# Patient Record
Sex: MALE | Race: WHITE | NOT HISPANIC OR LATINO | Employment: UNEMPLOYED | URBAN - NONMETROPOLITAN AREA
[De-identification: names, ages, dates, MRNs, and addresses within clinical notes are randomized per-mention and may not be internally consistent; named-entity substitution may affect disease eponyms.]

---

## 2023-03-08 ENCOUNTER — APPOINTMENT (OUTPATIENT)
Dept: CT IMAGING | Facility: HOSPITAL | Age: 28
End: 2023-03-08
Payer: COMMERCIAL

## 2023-03-08 ENCOUNTER — APPOINTMENT (OUTPATIENT)
Dept: GENERAL RADIOLOGY | Facility: HOSPITAL | Age: 28
End: 2023-03-08
Payer: COMMERCIAL

## 2023-03-08 ENCOUNTER — HOSPITAL ENCOUNTER (EMERGENCY)
Facility: HOSPITAL | Age: 28
Discharge: HOME | End: 2023-03-08
Attending: EMERGENCY MEDICINE | Admitting: EMERGENCY MEDICINE
Payer: COMMERCIAL

## 2023-03-08 VITALS
OXYGEN SATURATION: 95 % | WEIGHT: 245 LBS | SYSTOLIC BLOOD PRESSURE: 128 MMHG | DIASTOLIC BLOOD PRESSURE: 94 MMHG | HEART RATE: 98 BPM | HEIGHT: 69 IN | BODY MASS INDEX: 36.29 KG/M2 | RESPIRATION RATE: 18 BRPM | TEMPERATURE: 98.9 F

## 2023-03-08 DIAGNOSIS — R07.9 CHEST PAIN, UNSPECIFIED TYPE: ICD-10-CM

## 2023-03-08 DIAGNOSIS — R03.0 ELEVATED BLOOD PRESSURE READING: Primary | ICD-10-CM

## 2023-03-08 LAB
ALBUMIN SERPL-MCNC: 5 G/DL (ref 3.5–5.2)
ALBUMIN/GLOB SERPL: 1.4 G/DL
ALP SERPL-CCNC: 85 U/L (ref 39–117)
ALT SERPL W P-5'-P-CCNC: 21 U/L (ref 1–41)
ANION GAP SERPL CALCULATED.3IONS-SCNC: 14.2 MMOL/L (ref 5–15)
AST SERPL-CCNC: 17 U/L (ref 1–40)
BASOPHILS # BLD AUTO: 0.03 10*3/MM3 (ref 0–0.2)
BASOPHILS NFR BLD AUTO: 0.4 % (ref 0–1.5)
BILIRUB SERPL-MCNC: 0.6 MG/DL (ref 0–1.2)
BUN SERPL-MCNC: 14 MG/DL (ref 6–20)
BUN/CREAT SERPL: 12.6 (ref 7–25)
CALCIUM SPEC-SCNC: 10.6 MG/DL (ref 8.6–10.5)
CHLORIDE SERPL-SCNC: 95 MMOL/L (ref 98–107)
CO2 SERPL-SCNC: 26.8 MMOL/L (ref 22–29)
CREAT SERPL-MCNC: 1.11 MG/DL (ref 0.76–1.27)
DEPRECATED RDW RBC AUTO: 35.8 FL (ref 37–54)
EGFRCR SERPLBLD CKD-EPI 2021: 92.8 ML/MIN/1.73
EOSINOPHIL # BLD AUTO: 0.1 10*3/MM3 (ref 0–0.4)
EOSINOPHIL NFR BLD AUTO: 1.2 % (ref 0.3–6.2)
ERYTHROCYTE [DISTWIDTH] IN BLOOD BY AUTOMATED COUNT: 11.9 % (ref 12.3–15.4)
GLOBULIN UR ELPH-MCNC: 3.7 GM/DL
GLUCOSE SERPL-MCNC: 96 MG/DL (ref 65–99)
HCT VFR BLD AUTO: 51.7 % (ref 37.5–51)
HGB BLD-MCNC: 17.9 G/DL (ref 13–17.7)
IMM GRANULOCYTES # BLD AUTO: 0.02 10*3/MM3 (ref 0–0.05)
IMM GRANULOCYTES NFR BLD AUTO: 0.2 % (ref 0–0.5)
LYMPHOCYTES # BLD AUTO: 2.41 10*3/MM3 (ref 0.7–3.1)
LYMPHOCYTES NFR BLD AUTO: 28.4 % (ref 19.6–45.3)
MCH RBC QN AUTO: 28.3 PG (ref 26.6–33)
MCHC RBC AUTO-ENTMCNC: 34.6 G/DL (ref 31.5–35.7)
MCV RBC AUTO: 81.8 FL (ref 79–97)
MONOCYTES # BLD AUTO: 0.5 10*3/MM3 (ref 0.1–0.9)
MONOCYTES NFR BLD AUTO: 5.9 % (ref 5–12)
NEUTROPHILS NFR BLD AUTO: 5.44 10*3/MM3 (ref 1.7–7)
NEUTROPHILS NFR BLD AUTO: 63.9 % (ref 42.7–76)
NRBC BLD AUTO-RTO: 0 /100 WBC (ref 0–0.2)
PLATELET # BLD AUTO: 235 10*3/MM3 (ref 140–450)
PMV BLD AUTO: 10.3 FL (ref 6–12)
POTASSIUM SERPL-SCNC: 4.1 MMOL/L (ref 3.5–5.2)
PROT SERPL-MCNC: 8.7 G/DL (ref 6–8.5)
RBC # BLD AUTO: 6.32 10*6/MM3 (ref 4.14–5.8)
SODIUM SERPL-SCNC: 136 MMOL/L (ref 136–145)
TROPONIN T SERPL HS-MCNC: <6 NG/L
WBC NRBC COR # BLD: 8.5 10*3/MM3 (ref 3.4–10.8)

## 2023-03-08 PROCEDURE — 99282 EMERGENCY DEPT VISIT SF MDM: CPT

## 2023-03-08 PROCEDURE — 70450 CT HEAD/BRAIN W/O DYE: CPT

## 2023-03-08 PROCEDURE — 99283 EMERGENCY DEPT VISIT LOW MDM: CPT

## 2023-03-08 PROCEDURE — 93005 ELECTROCARDIOGRAM TRACING: CPT

## 2023-03-08 PROCEDURE — 36415 COLL VENOUS BLD VENIPUNCTURE: CPT

## 2023-03-08 PROCEDURE — 85025 COMPLETE CBC W/AUTO DIFF WBC: CPT

## 2023-03-08 PROCEDURE — 84484 ASSAY OF TROPONIN QUANT: CPT

## 2023-03-08 PROCEDURE — 80053 COMPREHEN METABOLIC PANEL: CPT

## 2023-03-08 PROCEDURE — 71045 X-RAY EXAM CHEST 1 VIEW: CPT

## 2023-03-08 NOTE — DISCHARGE INSTRUCTIONS
Return for any worsening symptoms.  Suggest following up with your primary care for further management of your blood pressure medications and discussions about these medications.  I recommend follow-up with cardiology, their number is included above, you should call to schedule an appoint with them.

## 2023-03-08 NOTE — ED PROVIDER NOTES
"Subjective  History of Present Illness:    This is a 27-year-old male presents emergency room today for chief complaint of hypertension.  Patient reports that he has had hypertension ever since he had a head injury approximately 1-1/2 months ago where he was doing a \"dab\" and lost consciousness, falling hitting his head and apparently having a seizure afterwards.  He followed up with his primary care who reported that he was hypertensive, placed him on blood pressure medications and then also ordered a outpatient scan of his head which the patient was not able to obtain due to insurance issues.  He reports that he was feeling lightheaded over the last month and a half since this happened, his primary care told him this could be the result of blood pressure medications and they switched his blood pressure medications, however warned him that he could have similar symptoms on blood pressure medications of lightheadedness.  He reports today the emergency room today for continued complaints of lightheadedness, and some intermittent episodes of a sharp pain in his left upper chest.  He is currently on hydrochlorothiazide and lisinopril.  He reports that all these feelings started after he hit his head and then after he was placed on blood pressure medications.  He reports that the sharp pain in his left upper chest is not there currently.  He does not report any shortness of air.  He reports that the sharpness in his chest usually occurs when he is overthinking things.       Nurses Notes reviewed and agree, including vitals, allergies, social history and prior medical history.     REVIEW OF SYSTEMS: All systems reviewed and not pertinent unless noted.  Review of Systems   Cardiovascular: Positive for chest pain.   Neurological: Positive for light-headedness.   All other systems reviewed and are negative.      Past Medical History:   Diagnosis Date   • Hypertension    • Seizures (HCC)        Allergies:    Patient has no " "known allergies.      History reviewed. No pertinent surgical history.      Social History     Socioeconomic History   • Marital status: Single   Substance and Sexual Activity   • Drug use: Yes     Types: Marijuana         History reviewed. No pertinent family history.    Objective  Physical Exam:  /94   Pulse 96   Temp 98.9 °F (37.2 °C) (Oral)   Resp 18   Ht 175.3 cm (69\")   Wt 111 kg (245 lb)   SpO2 97%   BMI 36.18 kg/m²      Physical Exam  Vitals and nursing note reviewed.   Constitutional:       General: He is not in acute distress.     Appearance: Normal appearance. He is normal weight. He is not ill-appearing, toxic-appearing or diaphoretic.   HENT:      Head: Normocephalic and atraumatic.      Nose: Nose normal. No congestion or rhinorrhea.      Mouth/Throat:      Mouth: Mucous membranes are moist.   Eyes:      Extraocular Movements: Extraocular movements intact.   Cardiovascular:      Rate and Rhythm: Normal rate and regular rhythm.      Pulses: Normal pulses.      Heart sounds: Normal heart sounds.   Pulmonary:      Effort: Pulmonary effort is normal. No respiratory distress.      Breath sounds: Normal breath sounds. No stridor. No wheezing, rhonchi or rales.   Abdominal:      General: There is no distension.      Palpations: Abdomen is soft.      Tenderness: There is no abdominal tenderness. There is no guarding.   Musculoskeletal:         General: Normal range of motion.      Cervical back: Normal range of motion and neck supple. No rigidity or tenderness.   Skin:     General: Skin is warm and dry.      Capillary Refill: Capillary refill takes less than 2 seconds.   Neurological:      General: No focal deficit present.      Mental Status: He is alert and oriented to person, place, and time.      Cranial Nerves: No cranial nerve deficit.      Sensory: No sensory deficit.      Motor: No weakness.      Coordination: Coordination normal.      Gait: Gait normal.      Deep Tendon Reflexes: Reflexes " normal.   Psychiatric:         Mood and Affect: Mood normal.         Behavior: Behavior normal.         Thought Content: Thought content normal.         Judgment: Judgment normal.               Procedures    ED Course:    ED Course as of 03/08/23 1558   Wed Mar 08, 2023   1359 Will obtain CT of the head, ECG, high-sensitivity troponin, CBC and CMP. [JR]   1415 EKG interpreted by me reveals sinus rhythm with rate of 95 bpm.  There is sinus arrhythmia.  Low QRS voltage in chest leads.  No acute ischemic findings.  This is an atypical appearing EKG [TB]      ED Course User Index  [JR] Owen Taylor PA-C  [TB] Tigist Rodarte MD       Lab Results (last 24 hours)     Procedure Component Value Units Date/Time    CBC Auto Differential [[id number]]  (Abnormal) Collected: 03/08/23 1353    Specimen: Blood Updated: 03/08/23 1359     WBC 8.50 10*3/mm3      RBC 6.32 10*6/mm3      Hemoglobin 17.9 g/dL      Hematocrit 51.7 %      MCV 81.8 fL      MCH 28.3 pg      MCHC 34.6 g/dL      RDW 11.9 %      RDW-SD 35.8 fl      MPV 10.3 fL      Platelets 235 10*3/mm3      Neutrophil % 63.9 %      Lymphocyte % 28.4 %      Monocyte % 5.9 %      Eosinophil % 1.2 %      Basophil % 0.4 %      Immature Grans % 0.2 %      Neutrophils, Absolute 5.44 10*3/mm3      Lymphocytes, Absolute 2.41 10*3/mm3      Monocytes, Absolute 0.50 10*3/mm3      Eosinophils, Absolute 0.10 10*3/mm3      Basophils, Absolute 0.03 10*3/mm3      Immature Grans, Absolute 0.02 10*3/mm3      nRBC 0.0 /100 WBC     Comprehensive Metabolic Panel [[id number]]  (Abnormal) Collected: 03/08/23 1353    Specimen: Blood Updated: 03/08/23 1418     Glucose 96 mg/dL      BUN 14 mg/dL      Creatinine 1.11 mg/dL      Sodium 136 mmol/L      Potassium 4.1 mmol/L      Chloride 95 mmol/L      CO2 26.8 mmol/L      Calcium 10.6 mg/dL      Total Protein 8.7 g/dL      Albumin 5.0 g/dL      ALT (SGPT) 21 U/L      AST (SGOT) 17 U/L      Alkaline Phosphatase 85 U/L      Total Bilirubin 0.6  mg/dL      Globulin 3.7 gm/dL      A/G Ratio 1.4 g/dL      BUN/Creatinine Ratio 12.6     Anion Gap 14.2 mmol/L      eGFR 92.8 mL/min/1.73     Narrative:      GFR Normal >60  Chronic Kidney Disease <60  Kidney Failure <15      High Sensitivity Troponin T [[id number]]  (Normal) Collected: 03/08/23 1353    Specimen: Blood Updated: 03/08/23 1420     HS Troponin T <6 ng/L     Narrative:      High Sensitive Troponin T Reference Range:  <10.0 ng/L- Negative Female for AMI  <15.0 ng/L- Negative Male for AMI  >=10 - Abnormal Female indicating possible myocardial injury.  >=15 - Abnormal Male indicating possible myocardial injury.   Clinicians would have to utilize clinical acumen, EKG, Troponin, and serial changes to determine if it is an Acute Myocardial Infarction or myocardial injury due to an underlying chronic condition.                CT Head Without Contrast    Result Date: 3/8/2023  PROCEDURE: CT HEAD WO CONTRAST-  INDICATION: lightheadedness, hit head one month ago with + LOC  TECHNIQUE: Multiple axial CT images were performed from the foramen magnum to the vertex without contrast.   Coronal reconstruction images were obtained from the axial data.  COMPARISON: None.  FINDINGS: There is no mass effect or midline shift.  The ventricles are symmetric in size and configuration.  There is no hydrocephalus.  There are no extraaxial fluid collections.  There is no intraventricular or intraparenchymal hemorrhage.  The posterior fossa has a normal CT appearance.  The soft tissues are within normal limits. No acute osseous abnormalities are present.      Impression: No acute intracranial abnormality.       836.26 mGy.cm    This study was performed with techniques to keep radiation doses as low as reasonably achievable (ALARA). Individualized dose reduction techniques using automated exposure control or adjustment of mA and/or kV according to the patient size were employed.  This report was signed and finalized on 3/8/2023 2:38  "PM by Ashley Miller MD.    XR Chest 1 View    Result Date: 3/8/2023  PROCEDURE: XR CHEST 1 VW-    HISTORY: Hypertension, anterior chest pain  COMPARISON: None.  FINDINGS: The heart is normal in size. The mediastinum is unremarkable. The lungs are clear. There is no pneumothorax. There are no acute osseous abnormalities.      Impression: No acute cardiopulmonary process.        Images were reviewed, interpreted, and dictated by Dr. Ashley Miller MD Transcribed by Mel Nieves PA-C.  This report was signed and finalized on 3/8/2023 3:53 PM by Ashley Miller MD.         MDM    Initial impression of presenting illness: This is a 27-year-old male presents emergency room today for chief complaint of hypertension.  Patient reports that he has had hypertension ever since he had a head injury approximately 1-1/2 months ago where he was doing a \"dab\" and lost consciousness, falling hitting his head and apparently having a seizure afterwards.  He followed up with his primary care who reported that he was hypertensive, placed him on blood pressure medications and then also ordered a outpatient scan of his head which the patient was not able to obtain due to insurance issues.  He reports that he was feeling lightheaded over the last month and a half since this happened, his primary care told him this could be the result of blood pressure medications and they switched his blood pressure medications, however warned him that he could have similar symptoms on blood pressure medications of lightheadedness.  He reports today the emergency room today for continued complaints of lightheadedness, and some intermittent episodes of a sharp pain in his left upper chest.  He is currently on hydrochlorothiazide and lisinopril.  He reports that all these feelings started after he hit his head and then after he was placed on blood pressure medications.  He reports that the sharp pain in his left upper chest is not there currently.  He " does not report any shortness of air.  He reports that the sharpness in his chest usually occurs when he is overthinking things.  Patient reports that he does not currently feel the sharp pain in his left upper chest, it is nonradiating when it occurs, and has been occurring for over a month at this point at least once daily and last only a few seconds at a time.    DDX: includes but is not limited to: Intracranial abnormality, electrolyte disturbance, costochondritis, medication adverse effect of blood pressure medications.ACS, Although extremely low suspicion given the patient's age and lack of risk factors and chronicity of the issue    Patient arrives hemodynamically stable, afebrile, some hypertension at 133/94, nontachycardic, nonhypoxic, nontoxic-appearing with vitals interpreted by myself.     Pertinent features from physical exam: Patient has grossly intact neurological exam, no abnormal coordination, equal upper and lower extremity strength, equal sensation.  Cardiac auscultation regular rate rhythm, lungs clear to auscultation bilaterally.  No tenderness to palpation of the chest.    Initial diagnostic plan: CT of the head without contrast, CBC, CMP, troponin, EKG, chest x-ray    Results from initial plan were reviewed and interpreted by me revealing opponent within normal limits.  CMP essentially negative.  CBC also essentially negative.  EKG revealed sinus rhythm rate of 95 with sinus arrhythmia no acute ischemic changes.  CT of the head with no acute intracranial abnormality.  X-ray of the chest plain film with no acute cardiopulmonary process per radiology.  I personally evaluate this plain film and concur with radiology interpretation.  Cardiac enzymes normal.      Diagnostic information from other sources: N/A    Interventions / Re-evaluation: No interventions necessary at this time, no active sharp pain in the chest.  He is stable for discharge home.  Neurologic exam benign.    Results/clinical  rationale were discussed with patient bedside.  Discussed at this point with CT of the head that is negative, no electrolyte abnormalities, EKG, or other blood work to explain the patient's symptoms, believe that this is likely and probable more related to the patient's medication side effects/adverse effects given that he is on 2 different blood pressure medications.  Discussed strict return precautions.  He is agreeable to follow-up with primary care for further management of his medications and discussions with his providers about his blood pressure medications and the effects he has been experiencing since beginning these medications.     Heart score of 1 given the patient's recent history of hypertension.  Otherwise negative.     Consultations/Discussion of results with other physicians: N/A    Disposition plan: Discharge home, primary care follow-up.  Patient agreeable to also follow-up with cardiology although I am not highly suspicious of a cardiac etiology of the 1 episode of sharp pain that the patient has been feeling over the last month for a few seconds each day.  Return precautions given.  -----    Final diagnoses:   Elevated blood pressure reading   Chest pain, unspecified type        Owen Taylor PA-C  03/08/23 4790

## 2023-03-10 ENCOUNTER — HOSPITAL ENCOUNTER (EMERGENCY)
Facility: HOSPITAL | Age: 28
Discharge: HOME | End: 2023-03-10
Attending: EMERGENCY MEDICINE | Admitting: EMERGENCY MEDICINE
Payer: COMMERCIAL

## 2023-03-10 VITALS
BODY MASS INDEX: 35.55 KG/M2 | OXYGEN SATURATION: 99 % | RESPIRATION RATE: 16 BRPM | HEART RATE: 77 BPM | HEIGHT: 69 IN | SYSTOLIC BLOOD PRESSURE: 134 MMHG | DIASTOLIC BLOOD PRESSURE: 92 MMHG | WEIGHT: 240 LBS | TEMPERATURE: 98.3 F

## 2023-03-10 DIAGNOSIS — F41.9 ANXIETY: ICD-10-CM

## 2023-03-10 DIAGNOSIS — I10 HYPERTENSION, UNSPECIFIED TYPE: Primary | ICD-10-CM

## 2023-03-10 LAB
BILIRUB UR QL STRIP: NEGATIVE
CLARITY UR: CLEAR
COLOR UR: YELLOW
GLUCOSE UR STRIP-MCNC: NEGATIVE MG/DL
HGB UR QL STRIP.AUTO: NEGATIVE
KETONES UR QL STRIP: NEGATIVE
LEUKOCYTE ESTERASE UR QL STRIP.AUTO: NEGATIVE
NITRITE UR QL STRIP: NEGATIVE
PH UR STRIP.AUTO: 7 [PH] (ref 5–8)
PROT UR QL STRIP: NEGATIVE
SP GR UR STRIP: 1.02 (ref 1–1.03)
UROBILINOGEN UR QL STRIP: NORMAL

## 2023-03-10 PROCEDURE — 81003 URINALYSIS AUTO W/O SCOPE: CPT

## 2023-03-10 PROCEDURE — 93005 ELECTROCARDIOGRAM TRACING: CPT

## 2023-03-10 PROCEDURE — 99284 EMERGENCY DEPT VISIT MOD MDM: CPT

## 2023-03-10 RX ORDER — PROPRANOLOL HYDROCHLORIDE 10 MG/1
10 TABLET ORAL 2 TIMES DAILY
Qty: 60 TABLET | Refills: 0 | Status: SHIPPED | OUTPATIENT
Start: 2023-03-10 | End: 2023-03-14 | Stop reason: SDUPTHER

## 2023-03-10 RX ORDER — PROPRANOLOL HYDROCHLORIDE 10 MG/1
10 TABLET ORAL ONCE
Status: COMPLETED | OUTPATIENT
Start: 2023-03-10 | End: 2023-03-10

## 2023-03-10 RX ORDER — HYDROCHLOROTHIAZIDE 12.5 MG/1
12.5 CAPSULE, GELATIN COATED ORAL DAILY
COMMUNITY
Start: 2023-03-03 | End: 2023-03-14 | Stop reason: ALTCHOICE

## 2023-03-10 RX ORDER — LISINOPRIL 10 MG/1
10 TABLET ORAL DAILY
COMMUNITY
Start: 2023-03-03 | End: 2023-03-14 | Stop reason: ALTCHOICE

## 2023-03-10 RX ADMIN — PROPRANOLOL HYDROCHLORIDE 10 MG: 10 TABLET ORAL at 17:42

## 2023-03-10 NOTE — ED PROVIDER NOTES
"Subjective  History of Present Illness:    Patient is a 27-year-old male here today with high blood pressure and elevated heart rate.  He is accompanied by his wife who helps provide history as well.  Patient was seen here 2 days ago for similar complaint and had a negative work-up, including labs, EKG, and CT of the head.  Today he notes that he has been taking half doses of his lisinopril because he is feeling dizzy/unsteady with his current blood pressure regimen.  He has not had his HCTZ for 2 days.  He does admit to checking his blood pressure very often at home and will become concerned/anxious over the readings.  Before taking the HCTZ and lisinopril, the patient was on amlodipine for approximately 1 month and experienced dizziness during the entire time which is why he had medication changes.  He has noted consistent dizziness with taking the HCTZ and lisinopril.  Not having the HCTZ over the past 2 days, he has noted intermittent dizziness.  Also notes that he is feeling very thirsty and having a dry mouth, thinks that this may be due to the HCTZ.    Nurses Notes reviewed and agree, including vitals, allergies, social history and prior medical history.     REVIEW OF SYSTEMS: All systems reviewed and not pertinent unless noted.  Review of Systems    Past Medical History:   Diagnosis Date   • Hypertension    • Seizures (HCC)        Allergies:    Patient has no known allergies.      History reviewed. No pertinent surgical history.      Social History     Socioeconomic History   • Marital status: Single   Tobacco Use   • Smoking status: Never   Substance and Sexual Activity   • Alcohol use: Not Currently   • Drug use: Yes     Types: Marijuana         History reviewed. No pertinent family history.    Objective  Physical Exam:  /92   Pulse 77   Temp 98.3 °F (36.8 °C) (Oral)   Resp 16   Ht 175.3 cm (69\")   Wt 109 kg (240 lb)   SpO2 99%   BMI 35.44 kg/m²      Physical Exam  Vitals and nursing note " reviewed.   Constitutional:       Appearance: Normal appearance. He is normal weight.   HENT:      Head: Normocephalic and atraumatic.   Cardiovascular:      Rate and Rhythm: Regular rhythm. Tachycardia present.      Pulses: Normal pulses.      Heart sounds: Normal heart sounds.   Pulmonary:      Effort: Pulmonary effort is normal.      Breath sounds: Normal breath sounds.   Abdominal:      General: Abdomen is flat. Bowel sounds are normal. There is no distension.      Palpations: Abdomen is soft.      Tenderness: There is no abdominal tenderness.   Musculoskeletal:      Right lower leg: No edema.      Left lower leg: No edema.   Skin:     General: Skin is warm and dry.   Neurological:      General: No focal deficit present.      Mental Status: He is alert and oriented to person, place, and time.   Psychiatric:         Mood and Affect: Mood normal.         Behavior: Behavior normal.         Procedures    ED Course:         Lab Results (last 24 hours)     Procedure Component Value Units Date/Time    Urinalysis With Microscopic If Indicated (No Culture) - Urine, Clean Catch [[id number]]  (Normal) Collected: 03/10/23 1714    Specimen: Urine, Clean Catch Updated: 03/10/23 1721     Color, UA Yellow     Appearance, UA Clear     pH, UA 7.0     Specific Gravity, UA 1.023     Glucose, UA Negative     Ketones, UA Negative     Bilirubin, UA Negative     Blood, UA Negative     Protein, UA Negative     Leuk Esterase, UA Negative     Nitrite, UA Negative     Urobilinogen, UA 0.2 E.U./dL    Narrative:      Urine microscopic not indicated.           No radiology results from the last 24 hrs       MDM    Initial impression of presenting illness: Hypertension, tachycardia    DDX: includes but is not limited to: Uncontrolled hypertension, orthostatic tachycardia, dehydration, underlying anxiety    Patient arrives radiograph with vitals interpreted by myself.     Pertinent features from physical exam: Tachycardia ranging from the 104 to  130 bpm.    Initial diagnostic plan: Orthostatic vital signs, EKG, urinalysis, and propranolol    Results from initial plan were reviewed and interpreted by me revealing normal urinalysis with no evidence of blood or protein.  His heart rate increased with sitting and standing, but while the blood pressure was taking it did normalize back to the rate he was experiencing while lying.  Blood pressure did not change during orthostatic vital signs.  He did not have dizziness or weakness with changing positions.    Diagnostic information from other sources: Medical record and reviewed his visit from 2 days ago where he had a complete normal work-up including labs and CT scans.    Interventions / Re-evaluation: Treated patient's blood pressure as well as possible underlying anxiety with propranolol.  After taking 10 mg he had a significant improvement in his heart rate into the 80s, and his blood pressure slowly improved to a systolic in the 130s.    Results/clinical rationale were discussed with Dr. Daley    Consultations/Discussion of results with other physicians: None    Disposition plan: Patient is a 27-year-old male here today with hypertension and tachycardia most likely due to underlying anxiety that is not treated.  I chose propranolol as an option to treat these symptoms which was successful during his visit.  Advised him that since he has not had the HCTZ for 2 days, he should not restart it.  And if he is still experiencing symptoms with the half tablet of lisinopril, he can discontinue this as well and instead take propranolol twice daily.  Encouraged him to only check his blood pressure twice a day before he takes his propranolol and cannot check his blood pressure otherwise this may make him feel more anxious ultimately causing his blood pressure to increase.  He has a follow-up appointment with his primary provider next Friday and advised him to keep a log of his blood pressure readings and heart rate.   Patient is safe for discharge  -----    Final diagnoses:   Hypertension, unspecified type   Anxiety        Cristian Harmon, APRN  03/11/23 0020

## 2023-03-11 NOTE — DISCHARGE INSTRUCTIONS
Your heart rate and blood pressure seem to improve after taking the propranolol.  I suspect that you may be having underlying anxiety that is potentially causing you to have an increase in your blood pressure and heart rate despite your lisinopril and HCTZ.  Since you have not had your HCTZ for the past 2 days, do not restart this medication.  And since you are having dizziness associated with the lisinopril, even at half doses, would not take your dose tomorrow and instead only take the propranolol as directed.  Would recommend you check your blood pressure before each dose of propranolol and do not take your blood pressure in between or frequently as this can cause your blood pressure to worsen.  Follow-up with your primary provider as directed on Friday, keep track of your blood pressures and heart rate, and inform them of the changes to your medication.

## 2023-03-14 ENCOUNTER — OFFICE VISIT (OUTPATIENT)
Dept: CARDIOLOGY | Facility: CLINIC | Age: 28
End: 2023-03-14
Payer: COMMERCIAL

## 2023-03-14 VITALS
SYSTOLIC BLOOD PRESSURE: 138 MMHG | OXYGEN SATURATION: 99 % | HEART RATE: 74 BPM | DIASTOLIC BLOOD PRESSURE: 90 MMHG | WEIGHT: 247 LBS | RESPIRATION RATE: 18 BRPM | HEIGHT: 69 IN | BODY MASS INDEX: 36.58 KG/M2

## 2023-03-14 DIAGNOSIS — R00.2 PALPITATIONS: ICD-10-CM

## 2023-03-14 DIAGNOSIS — R07.9 CHEST PAIN, UNSPECIFIED TYPE: ICD-10-CM

## 2023-03-14 DIAGNOSIS — E66.01 SEVERE OBESITY (BMI 35.0-39.9) WITH COMORBIDITY: ICD-10-CM

## 2023-03-14 DIAGNOSIS — I10 ESSENTIAL HYPERTENSION: Primary | ICD-10-CM

## 2023-03-14 PROCEDURE — 99204 OFFICE O/P NEW MOD 45 MIN: CPT | Performed by: INTERNAL MEDICINE

## 2023-03-14 RX ORDER — AMLODIPINE BESYLATE 5 MG/1
TABLET ORAL
COMMUNITY
Start: 2022-12-30 | End: 2023-03-14 | Stop reason: ALTCHOICE

## 2023-03-14 RX ORDER — PROPRANOLOL HYDROCHLORIDE 10 MG/1
10 TABLET ORAL 2 TIMES DAILY
Qty: 180 TABLET | Refills: 3 | Status: SHIPPED | OUTPATIENT
Start: 2023-03-14

## 2023-03-14 RX ORDER — AMLODIPINE BESYLATE 10 MG/1
TABLET ORAL
COMMUNITY
Start: 2023-03-01 | End: 2023-03-14 | Stop reason: ALTCHOICE

## 2023-03-14 NOTE — PROGRESS NOTES
"     [facility] Cardiology OP Consult Note    Manjinder Amezcua  [id number]  2023    Referred By: No ref. provider found    Chief Complaint: Hypertension and palpitations    History of Present Illness:   Mr. Manjinder Amezcua is a 27 y.o. male who presents to the Cardiology Clinic for evaluation of palpitations and hypertension.  The patient has a past medical history significant for anxiety, hypertension, and obesity with a BMI 36.  He does not have any significant past cardiac history.  His more recent medical history significant for evaluation in the emergency department on 3/8/2023 and again on 3/11/2023 for evaluation of hypertension.  The patient reports he has recently been on several antihypertensive medications including amlodipine, HCTZ, and lisinopril.  While on lisinopril, the patient reports a \"dry mouth\" as well as episodes of dizziness and lightheadedness.  He also reported episodes of palpitations described as a \"racing heart rate.\"  He denies any presyncopal or syncopal events.  He does report a \"sharp\" pain located focally over his left anterior chest.  The chest discomfort typically lasts several seconds before resolving spontaneously.  No association with exertion.  Upon his recent ED evaluation, an ECG showed sinus tachycardia at 110 bpm with no significant abnormalities other than rate.  At that time, blood work including a high-sensitivity troponin was within normal limits.  His HCTZ was discontinued, and the patient was started on propanolol.  With propanolol, the patient reports resolution of his palpitations.  He also reports his systolic blood pressure has been better controlled with his highest recent reading being 150 mm systolic.  No other specific complaints today.    Past Cardiac Testin. Last Coronary Angio: None  2. Prior Stress Testing: None  3. Last Echo: None  4. Prior Holter Monitor: None    Review of Systems:   Review of Systems   Constitutional: Negative for " "activity change, appetite change, chills, diaphoresis, fatigue, fever, unexpected weight gain and unexpected weight loss.   Eyes: Negative for blurred vision and double vision.   Respiratory: Negative for cough, chest tightness, shortness of breath and wheezing.    Cardiovascular: Positive for chest pain and palpitations. Negative for leg swelling.   Gastrointestinal: Negative for abdominal pain, anal bleeding, blood in stool and GERD.   Endocrine: Negative for cold intolerance and heat intolerance.   Genitourinary: Negative for hematuria.   Neurological: Positive for dizziness and light-headedness. Negative for syncope and weakness.   Hematological: Does not bruise/bleed easily.   Psychiatric/Behavioral: Negative for depressed mood and stress. The patient is not nervous/anxious.        Past Medical History:   Past Medical History:   Diagnosis Date   • Hypertension    • Seizures (HCC)        Past Surgical History: No past surgical history on file.    Family History: No family history on file.    Social History:   Social History     Socioeconomic History   • Marital status: Single   Tobacco Use   • Smoking status: Never     Passive exposure: Never   Substance and Sexual Activity   • Alcohol use: Not Currently   • Drug use: Yes     Types: Marijuana       Medications:     Current Outpatient Medications:   •  propranolol (INDERAL) 10 MG tablet, Take 1 tablet by mouth 2 (Two) Times a Day., Disp: 180 tablet, Rfl: 3    Allergies:   No Known Allergies    Physical Exam:  Vital Signs:   Vitals:    03/14/23 1125   BP: 138/90   BP Location: Right arm   Patient Position: Sitting   Pulse: 74   Resp: 18   SpO2: 99%   Weight: 112 kg (247 lb)   Height: 175.3 cm (69\")       Physical Exam  Constitutional:       General: He is not in acute distress.     Appearance: He is obese. He is not diaphoretic.   HENT:      Head: Normocephalic and atraumatic.   Cardiovascular:      Rate and Rhythm: Normal rate and regular rhythm.      Heart " sounds: No murmur heard.  Pulmonary:      Effort: Pulmonary effort is normal. No respiratory distress.      Breath sounds: Normal breath sounds. No stridor. No wheezing, rhonchi or rales.   Abdominal:      General: Bowel sounds are normal. There is no distension.      Palpations: Abdomen is soft.      Tenderness: There is no abdominal tenderness. There is no guarding or rebound.   Musculoskeletal:         General: No swelling. Normal range of motion.      Cervical back: Neck supple. No tenderness.   Skin:     General: Skin is warm and dry.   Neurological:      General: No focal deficit present.      Mental Status: He is alert and oriented to person, place, and time.   Psychiatric:         Mood and Affect: Mood normal.         Behavior: Behavior normal.         Results Review:   I reviewed the patient's new clinical results.        Assessment / Plan:     1. Essential hypertension  -- Blood pressure appears to be better controlled today with systolic BP in the 130s  -- Will continue current dose of propanolol  -- Advised to continue to monitor blood pressure at home, systolic BP sustaining greater than 140 mmHg Will uptitrate propanolol  -- Follow-up in 3 months to reassess BP    2. Chest pain  -- Episodes of chest pain are atypical in character, more suggestive of musculoskeletal etiology  --Recent ECG without evidence of acute or prior ischemia  --High-sensitivity troponin within normal limits, no evidence of ACS  --Given chest pain is atypical to noncardiac in character low suspicion for ischemia, will defer further ischemic work-up for now    3. Palpitations  -- Recent ECG showed mild sinus tachycardia  --Prior TSH within normal limits  --Interval resolution of palpitations with propanolol  --If recurrent palpitations, would consider outpatient cardiac monitoring    4. Severe obesity (BMI 35.0-39.9)   -- Weight loss advised        Follow Up:   Return in about 3 months (around 6/14/2023).      Thank you for  allowing me to participate in the care of your patient. Please to not hesitate to contact me with additional questions or concerns.     VIRGINIE Dempsey MD  Interventional Cardiology   03/14/2023  11:29 EDT

## 2023-07-22 ENCOUNTER — HOSPITAL ENCOUNTER (EMERGENCY)
Facility: HOSPITAL | Age: 28
Discharge: HOME | End: 2023-07-22
Attending: EMERGENCY MEDICINE | Admitting: EMERGENCY MEDICINE
Payer: COMMERCIAL

## 2023-07-22 ENCOUNTER — APPOINTMENT (OUTPATIENT)
Dept: GENERAL RADIOLOGY | Facility: HOSPITAL | Age: 28
End: 2023-07-22
Payer: COMMERCIAL

## 2023-07-22 VITALS
BODY MASS INDEX: 34.8 KG/M2 | SYSTOLIC BLOOD PRESSURE: 139 MMHG | RESPIRATION RATE: 18 BRPM | DIASTOLIC BLOOD PRESSURE: 91 MMHG | OXYGEN SATURATION: 97 % | HEIGHT: 69 IN | HEART RATE: 91 BPM | TEMPERATURE: 98.7 F | WEIGHT: 235 LBS

## 2023-07-22 DIAGNOSIS — M25.531 RIGHT WRIST PAIN: Primary | ICD-10-CM
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
## 2023-07-22 PROCEDURE — 99282 EMERGENCY DEPT VISIT SF MDM: CPT

## 2023-07-22 PROCEDURE — 73110 X-RAY EXAM OF WRIST: CPT
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
## 2023-08-03 ENCOUNTER — OFFICE VISIT (OUTPATIENT)
Dept: CARDIOLOGY | Facility: CLINIC | Age: 28
End: 2023-08-03
Payer: COMMERCIAL

## 2023-08-03 VITALS
HEIGHT: 69 IN | RESPIRATION RATE: 16 BRPM | OXYGEN SATURATION: 97 % | WEIGHT: 251 LBS | BODY MASS INDEX: 37.18 KG/M2 | HEART RATE: 85 BPM | DIASTOLIC BLOOD PRESSURE: 92 MMHG | SYSTOLIC BLOOD PRESSURE: 132 MMHG

## 2023-08-03 DIAGNOSIS — I10 PRIMARY HYPERTENSION: Primary | ICD-10-CM

## 2023-08-03 DIAGNOSIS — R00.2 PALPITATIONS: ICD-10-CM

## 2023-08-03 DIAGNOSIS — E66.01 SEVERE OBESITY (BMI 35.0-39.9) WITH COMORBIDITY: ICD-10-CM

## 2023-09-06 ENCOUNTER — TRANSCRIBE ORDERS (OUTPATIENT)
Dept: ADMINISTRATIVE | Facility: HOSPITAL | Age: 28
End: 2023-09-06
Payer: COMMERCIAL

## 2023-09-14 ENCOUNTER — TELEPHONE (OUTPATIENT)
Dept: SURGERY | Facility: CLINIC | Age: 28
End: 2023-09-14

## 2023-09-14 NOTE — TELEPHONE ENCOUNTER
Patient no showed for appointment with Dr Vicente. Called and spoke with patient. He stated he forgot appointment. Rescheduled to next Thursday.

## 2023-09-18 NOTE — PROGRESS NOTES
Patient: Manjinder Amezcua    YOB: 1995    Date: 09/21/2023    Primary Care Provider: Vijay Williamson DO    Chief Complaint   Patient presents with    Rectal Bleeding       SUBJECTIVE:    History of present illness: Patient with complaint of rectal bleeding.  He states this is intermittent.  Can happen 1 or 2 times a year.  Last for couple of days.  He does not have constipation but occasionally has diarrhea but none recently.  No abdominal pain.  The following portions of the patient's history were reviewed and updated as appropriate: allergies, current medications, past family history, past medical history, past social history, past surgical history and problem list.      Review of Systems   Constitutional:  Negative for chills, diaphoresis, fatigue, fever and unexpected weight change.   HENT:  Negative for dental problem, drooling, ear discharge, hearing loss, trouble swallowing and voice change.    Eyes:  Negative for visual disturbance.   Respiratory:  Negative for apnea, cough, choking, chest tightness, shortness of breath and wheezing.    Cardiovascular:  Negative for chest pain, palpitations and leg swelling.   Gastrointestinal:  Positive for anal bleeding. Negative for abdominal distention, abdominal pain, blood in stool, constipation, diarrhea, nausea, rectal pain and vomiting.   Endocrine: Negative for cold intolerance and heat intolerance.   Genitourinary:  Negative for difficulty urinating, dysuria, flank pain, frequency, hematuria, scrotal swelling and testicular pain.   Musculoskeletal:  Negative for back pain, gait problem and joint swelling.   Skin:  Negative for color change, rash and wound.   Neurological:  Negative for dizziness, seizures, syncope, speech difficulty, weakness, light-headedness, numbness and headaches.   Hematological:  Negative for adenopathy. Does not bruise/bleed easily.   Psychiatric/Behavioral:  Negative for agitation, behavioral problems and confusion. The  "patient is not nervous/anxious.      Allergies:  No Known Allergies    Medications:    Current Outpatient Medications:     propranolol (INDERAL) 10 MG tablet, Take 1 tablet by mouth 2 (Two) Times a Day., Disp: 180 tablet, Rfl: 3    History:  Past Medical History:   Diagnosis Date    Hypertension     Seizures        No past surgical history on file.    History reviewed. No pertinent family history.    Social History     Tobacco Use    Smoking status: Never     Passive exposure: Never    Smokeless tobacco: Never   Vaping Use    Vaping Use: Never used   Substance Use Topics    Alcohol use: Not Currently    Drug use: Yes     Types: Marijuana        OBJECTIVE:    Vital Signs:   Vitals:    09/21/23 1417   BP: 138/92   Pulse: 67   Temp: 98.4 °F (36.9 °C)   TempSrc: Temporal   SpO2: 98%   Weight: 115 kg (253 lb 3.2 oz)   Height: 175.3 cm (69\")       Physical Exam:   General Appearance:    Alert, cooperative, in no acute distress   Head:    Normocephalic, without obvious abnormality, atraumatic   Eyes:            Normal.  No scleral icterus.  PERRLA    Lungs:     Clear to auscultation,respirations regular, even and                  unlabored    Heart:    Regular rhythm and normal rate, normal S1 and S2, no            murmur   Abdomen:   Soft and nontender   Extremities:   Moves all extremities well, no edema, no cyanosis, no             redness   Skin:   No bleeding, bruising or rash   Neurologic:   Normal without gross deficits.   Psychiatric: No evidence of depression or anxiety        Results Review:   None    Review of Systems was reviewed and confirmed as accurate as documented by the MA.    ASSESSMENT/PLAN:    1. Rectal bleeding        I recommend a colonoscopy with possible hemorrhoid banding.  He understands the procedures as well as the risks of bleeding and infection as well as perforation he understands ramifications of these potential complications and at this time he does wish to proceed with a " colonoscopy  .        Electronically signed by Jann Cancino MD  09/21/23

## 2023-09-21 ENCOUNTER — OFFICE VISIT (OUTPATIENT)
Dept: SURGERY | Facility: CLINIC | Age: 28
End: 2023-09-21
Payer: COMMERCIAL

## 2023-09-21 ENCOUNTER — PATIENT ROUNDING (BHMG ONLY) (OUTPATIENT)
Dept: SURGERY | Facility: CLINIC | Age: 28
End: 2023-09-21
Payer: COMMERCIAL

## 2023-09-21 VITALS
OXYGEN SATURATION: 98 % | SYSTOLIC BLOOD PRESSURE: 138 MMHG | WEIGHT: 253.2 LBS | TEMPERATURE: 98.4 F | HEIGHT: 69 IN | BODY MASS INDEX: 37.5 KG/M2 | DIASTOLIC BLOOD PRESSURE: 92 MMHG | HEART RATE: 67 BPM

## 2023-09-21 DIAGNOSIS — K62.5 RECTAL BLEEDING: Primary | ICD-10-CM

## 2023-09-21 PROCEDURE — 99204 OFFICE O/P NEW MOD 45 MIN: CPT | Performed by: SURGERY

## 2023-09-21 PROCEDURE — 1160F RVW MEDS BY RX/DR IN RCRD: CPT | Performed by: SURGERY

## 2023-09-21 PROCEDURE — 1159F MED LIST DOCD IN RCRD: CPT | Performed by: SURGERY

## 2023-09-21 PROCEDURE — 3075F SYST BP GE 130 - 139MM HG: CPT | Performed by: SURGERY

## 2023-09-21 PROCEDURE — 3080F DIAST BP >= 90 MM HG: CPT | Performed by: SURGERY

## 2023-09-21 RX ORDER — POLYETHYLENE GLYCOL 3350 17 G/17G
238 POWDER, FOR SOLUTION ORAL ONCE
Qty: 17 PACKET | Refills: 0 | Status: SHIPPED | OUTPATIENT
Start: 2023-09-21 | End: 2023-09-21

## 2023-09-21 RX ORDER — BISACODYL 5 MG/1
5 TABLET, DELAYED RELEASE ORAL TAKE AS DIRECTED
Qty: 4 TABLET | Refills: 0 | Status: SHIPPED | OUTPATIENT
Start: 2023-09-21 | End: 2024-09-20

## 2023-09-21 NOTE — PROGRESS NOTES
September 21, 2023    Hello, may I speak with Manjinder Amezcua?    My name is Chitra     I am  with [facility]  [address]  [place] [id number] [phone].    Before we get started may I verify your date of birth? 1995    I am calling to officially welcome you to our practice and ask about your recent visit. Is this a good time to talk? yes    Tell me about your visit with us. What things went well?  Amazing visit       We're always looking for ways to make our patients' experiences even better. Do you have recommendations on ways we may improve?  no    Overall were you satisfied with your first visit to our practice? yes       I appreciate you taking the time to speak with me today. Is there anything else I can do for you? no      Thank you, and have a great day.

## 2023-09-29 ENCOUNTER — TELEPHONE (OUTPATIENT)
Dept: SURGERY | Facility: CLINIC | Age: 28
End: 2023-09-29
Payer: COMMERCIAL

## 2023-09-29 NOTE — TELEPHONE ENCOUNTER
Spoke with Manjinder to confirm the procedure on 10/04/2023 @ [facility].